# Patient Record
Sex: FEMALE | Race: BLACK OR AFRICAN AMERICAN
[De-identification: names, ages, dates, MRNs, and addresses within clinical notes are randomized per-mention and may not be internally consistent; named-entity substitution may affect disease eponyms.]

---

## 2017-01-28 ENCOUNTER — HOSPITAL ENCOUNTER (EMERGENCY)
Dept: HOSPITAL 74 - JER | Age: 27
Discharge: HOME | End: 2017-01-28
Payer: COMMERCIAL

## 2017-01-28 VITALS — SYSTOLIC BLOOD PRESSURE: 153 MMHG | HEART RATE: 106 BPM | TEMPERATURE: 98 F | DIASTOLIC BLOOD PRESSURE: 87 MMHG

## 2017-01-28 VITALS — BODY MASS INDEX: 32.1 KG/M2

## 2017-01-28 DIAGNOSIS — M21.612: Primary | ICD-10-CM

## 2017-01-28 DIAGNOSIS — F17.210: ICD-10-CM

## 2017-01-28 NOTE — PDOC
History of Present Illness





- General


Chief Complaint: Pain


Stated Complaint: LT LEG FEELS FUNNY


Time Seen by Provider: 01/28/17 16:52


History Source: Patient


Exam Limitations: No Limitations





- History of Present Illness


Initial Comments: 





01/28/17 17:21











Chief complaint: Strange sensation left foot at times








History of present illness: Patient is a 26-year-old female with no significant 

medical history here today complaining of a strange sensation in her left foot 

at times for the last few weeks that starts in her first met tarsal joint and 

at times spreads to her dorsal foot. Patient denies any swelling of her left 

calf or foot. Patient denies any numbness of her foot left. She denies any back 

pain. Patient reports that she does stand at times for long period of time. 

Patient reports wearing sneeze covers most of the time. Patient was reading on 

the Internet that she might have decreased circulation became scared and 

decided to come here. Patient reports that at time she sits with her leg bent 

and onto her left foot. She denies any injury to her foot.


Occurred: reports: other (intermittent for a few weeks left foot feels funny 

mostly at 1st mcp jt )


Severity: Yes: mild


Lower Extremity Pain Location: left: foot (first metatarsal  jt mostly )


Method of Injury: Yes: other (sit on left foot at times)


Modifying Factors: improves with: None





Lower Ext. Injury Location





- Specific Injury Location


Foot: left foot other (slight discomfort to left 1 st metatarsal  jt can 

radiate to dorsal foot )





Extremity Pain Location





- Extremity Pain Location


Extremity Pain Locations: left: foot (1 st metatarsal jt can radiate to dorsal 

foot)





Past History





- Past Medical History


Allergies/Adverse Reactions: 


 Allergies











Allergy/AdvReac Type Severity Reaction Status Date / Time


 


No Known Allergies Allergy   Verified 01/28/17 16:26











Home Medications: 


Ambulatory Orders





NK [No Known Home Medication]  11/21/15 








Asthma: No


Cancer: No


Cardiac Disorders: No


Diabetes: No


HTN: No


Seizures: No


Thyroid Disease: No





- Psycho/Social/Smoking Cessation Hx


Anxiety: No


Suicidal Ideation: No


Smoking History: Current every day smoker


Have you smoked in the past 12 months: Yes


Number of Cigarettes Smoked Daily: 2


Information on smoking cessation initiated: Yes


'Breaking Loose' booklet given: 01/28/17


Hx Alcohol Use: Yes (SOCIAL)


Drug/Substance Use Hx: No


Substance Use Type: None


Hx Substance Use Treatment: No





**Review of Systems





- Review of Systems


Able to Perform ROS?: Yes


Constitutional: No: Symptoms Reported


HEENTM: No: Symptoms Reported


Respiratory: No: Symptoms reported


Cardiac (ROS): No: Symptoms Reported


ABD/GI: No: Symptoms Reported


: No: Symptoms Reported


Musculoskeletal: Yes: Joint Pain (left foot starts at 1st metatarsal at time 

discomfort to dorsal foot)


Integumentary: No: Symptoms Reported


Neurological: No: Symptoms reported





*Physical Exam





- Vital Signs


 Last Vital Signs











Temp Pulse Resp BP Pulse Ox


 


 98.0 F   106 H  20   153/87   100 


 


 01/28/17 16:22  01/28/17 16:22  01/28/17 16:22  01/28/17 16:22  01/28/17 16:22














- Physical Exam


General Appearance: Yes: Appropriately Dressed


Respiratory/Chest: positive: Lungs Clear, Normal Breath Sounds.  negative: 

Chest Tender, Respiratory Distress


Cardiovascular: positive: Regular Rhythm, Regular Rate, S1, S2


Vascular Pulses: Doralis-Pedis (L): 4+


Musculoskeletal: positive: Normal Inspection.  negative: CVA Tenderness, CVA 

Tenderness (L), Decreased Range of Motion, Vertebral Tenderness


Extremity: positive: Normal Capillary Refill, Normal Inspection, Normal Range 

of Motion, Tender (left first metatarsal jt, bunion noted ), Other (no swelling 

of calf left, negative Perry left )


Integumentary: positive: Normal Color


Neurologic: positive: Alert, Normal Response (left foot ), Respond to painful 

stimul (left foot), Responsive.  negative: Numbness (left foot ), Sensory 

Deficit (left foot )


Deep Tendon Reflexes: Ankle (L): 4+





Medical Decision Making





- Medical Decision Making


01/28/17 17:21








01/28/17 17:23


 Patient is a 26-year-old female with no significant medical history here today 

complaining of a strange sensation in her left foot at times for the last few 

weeks that starts in her first met tarsal joint and at times spreads to her 

dorsal foot. Patient denies any swelling of her left calf or foot. Patient 

denies any numbness of her foot left. She denies any back pain. Patient reports 

that she does stand at times for long period of time. Patient reports wearing 

sneeze covers most of the time. Patient was reading on the Internet that she 

might have decreased circulation became scared and decided to come here. 

Patient reports that at time she sits with her leg bent and onto her left foot. 

She denies any injury to her foot.














Strange sensation left foot bunion noted left worse than right











Plan:





Follow-up with podiatry for further evaluation





*DC/Admit/Observation/Transfer


Diagnosis at time of Disposition: 


 Bunion of left foot





- Discharge Dispostion


Disposition: HOME


Condition at time of disposition: Stable





- Referrals


Referrals: 


Job Arizmendi MD [Primary Care Provider] - 


Lashaun Bueno MD [Staff Physician] - 





- Patient Instructions


Additional Instructions: 











follow Up with podiatrist this week for further evaluation








Return to emergency room if symptoms worsen any swelling of left foot or leg 











Avoid Sitting on your left foot

















She voiced understanding of discharge instructions and all questions were 

answered

## 2017-05-13 ENCOUNTER — HOSPITAL ENCOUNTER (EMERGENCY)
Dept: HOSPITAL 74 - JERFT | Age: 27
Discharge: HOME | End: 2017-05-13
Payer: COMMERCIAL

## 2017-05-13 VITALS — TEMPERATURE: 98.5 F | DIASTOLIC BLOOD PRESSURE: 59 MMHG | SYSTOLIC BLOOD PRESSURE: 105 MMHG | HEART RATE: 92 BPM

## 2017-05-13 VITALS — BODY MASS INDEX: 35.1 KG/M2

## 2017-05-13 DIAGNOSIS — F17.210: ICD-10-CM

## 2017-05-13 DIAGNOSIS — M21.612: Primary | ICD-10-CM

## 2017-05-13 PROCEDURE — 3E0233Z INTRODUCTION OF ANTI-INFLAMMATORY INTO MUSCLE, PERCUTANEOUS APPROACH: ICD-10-PCS

## 2017-05-13 NOTE — PDOC
History of Present Illness





- General


Chief Complaint: Pain


Stated Complaint: FOOT PAIN


Time Seen by Provider: 05/13/17 10:15


History Source: Patient


Exam Limitations: No Limitations





- History of Present Illness


Initial Comments: 





05/13/17 10:32


C/o pain to left great toe / foot since yesterday/ Knows has Bunion with 

appointment to Podiatry the end of the month. 


Occurred: reports: just prior to arrival, yesterday


Severity: reports: mild, moderate


Pain Location: reports: lower extremity (left foot- )


Modifying Factors: improves with: None





Past History





- Travel


Traveled outside of the country in the last 30 days: No


Close contact w/someone who was outside of country & ill: No





- Past Medical History


Allergies/Adverse Reactions: 


 Allergies











Allergy/AdvReac Type Severity Reaction Status Date / Time


 


No Known Allergies Allergy   Verified 05/13/17 09:58











Home Medications: 


Ambulatory Orders





Naproxen [Naprosyn -] 500 mg PO BID #20 tablet 05/13/17 








Asthma: No


Cancer: No


Cardiac Disorders: No


Diabetes: No


HTN: No


Seizures: No


Thyroid Disease: No


Other medical history: denies.





- Psycho/Social/Smoking Cessation Hx


Anxiety: No


Suicidal Ideation: No


Smoking History: Current every day smoker


Have you smoked in the past 12 months: Yes


Number of Cigarettes Smoked Daily: 2


Information on smoking cessation initiated: No


'Breaking Loose' booklet given: 01/28/17


Hx Alcohol Use: Yes (occassionally.)


Drug/Substance Use Hx: No


Substance Use Type: Alcohol


Hx Substance Use Treatment: No





**Review of Systems





- Review of Systems


Able to Perform ROS?: Yes


Is the patient limited English proficient: Yes


Constitutional: Yes: See HPI.  No: Symptoms Reported, Chills, Fever


HEENTM: No: Symptoms Reported


Respiratory: No: Symptoms reported


Musculoskeletal: Yes: Symptoms Reported, Joint Pain, Joint Swelling


Integumentary: Yes: Symptoms Reported, See HPI


All Other Systems: Reviewed and Negative





*Physical Exam





- Vital Signs


 Last Vital Signs











Temp Pulse Resp BP Pulse Ox


 


 98.5 F   92 H  18   105/59   98 


 


 05/13/17 09:58  05/13/17 09:58  05/13/17 09:58  05/13/17 09:58  05/13/17 09:58














- Physical Exam


General Appearance: Yes: Nourished, Appropriately Dressed, Mild Distress


HEENT: positive: CISCO, Normal ENT Inspection, Normal Voice, TMs Normal, Pharynx 

Normal


Neck: positive: Supple.  negative: Tender


Respiratory/Chest: positive: Lungs Clear


Gastrointestinal/Abdominal: positive: Normal Bowel Sounds, Tender, Flat, Soft


Extremity: positive: Normal Capillary Refill.  negative: Normal Inspection (

mild valgus deformity to left great toe. pain at MCP 1st toe, )


Neurologic: positive: CNs II-XII NML intact, Fully Oriented, Alert, Normal Mood/

Affect, Normal Response, Motor Strength 5/5





Progress Note





- Progress Note


Progress Note: 


Bunion pain / will treat with NSAIDs 





*DC/Admit/Observation/Transfer


Diagnosis at time of Disposition: 


 Bunion of left foot





- Discharge Dispostion


Disposition: HOME


Condition at time of disposition: Stable


Admit: No





- Prescriptions


Prescriptions: 


Naproxen [Naprosyn -] 500 mg PO BID #20 tablet





- Referrals


Referrals: 


Job Arizmendi MD [Primary Care Provider] - 





- Patient Instructions


Printed Discharge Instructions:  DI for Bunion


Additional Instructions: 


Rest, ice to area on and off for 15 minutes 4-6 times a day


Avoid heavy lifting or exercise until pain and swelling is resolved or until 

further directed


Keep area highly elevated to reduce swelling


Use splints/Ace wrap as directed


Followup with orthopedist in one to 2 days if not improving, 


    if significantly improved may wait one week for followup with orthopedist





May use Naprosyn 1-500 milligrams tablet 8 hours as needed for pain





- Post Discharge Activity


Work/School Note:  Back to Work

## 2017-09-08 ENCOUNTER — HOSPITAL ENCOUNTER (EMERGENCY)
Dept: HOSPITAL 74 - JERFT | Age: 27
Discharge: HOME | End: 2017-09-08
Payer: COMMERCIAL

## 2017-09-08 VITALS — TEMPERATURE: 98.2 F | DIASTOLIC BLOOD PRESSURE: 92 MMHG | SYSTOLIC BLOOD PRESSURE: 128 MMHG | HEART RATE: 97 BPM

## 2017-09-08 VITALS — BODY MASS INDEX: 34.1 KG/M2

## 2017-09-08 DIAGNOSIS — M21.612: Primary | ICD-10-CM

## 2017-09-08 DIAGNOSIS — M20.12: ICD-10-CM

## 2017-09-08 NOTE — PDOC
History of Present Illness





- General


Chief Complaint: Pain


Stated Complaint: LT FOOT PAIN


History Source: Patient


Exam Limitations: No Limitations





- History of Present Illness


Initial Comments: 





09/08/17 16:18











CHIEF COMPLAINT: LEFT FOOT PAIN CHRONIC WORSE RECENTLY





HISTORY OF PRESENT ILLNESS:Patient is a 27-year-old female with no significant 

medical history except for a bunion on her left foot here today complaining of 

worsening pain to the bunion area over the last few weeks. Patient had seen 

podiatrist and podiatrist had wanted an x-ray of the area Dr. Lashaun Nobles. 

Patient denies doing any strenuous activities or any injury to her left foot. 

Patient denies any chance of pregnancy is on birth control.








Occurred: reports: other (MONTHS  WORSE RECENTLY )


Lower Extremity Pain Location: left: 1st toe (proximal aspect at metatarsal jt)


Method of Injury: Yes: other (no injury )


Modifying Factors: improves with: None





Lower Ext. Injury Location





- Specific Injury Location


Foot: left foot normal range of motion, left foot bone tenderness (1st 

metatarsal ), left foot pain (1st metatarsal ), left foot swelling (deformity 

noted at 1st metatarsal jt)





Extremity Pain Location





- Extremity Pain Location


Extremity Pain Locations: left: 1st toe (metatarsal )





Past History





- Past Medical History


Allergies/Adverse Reactions: 


 Allergies











Allergy/AdvReac Type Severity Reaction Status Date / Time


 


No Known Allergies Allergy   Verified 09/08/17 13:28











Home Medications: 


Ambulatory Orders





NK [No Known Home Medication]  09/08/17 








Asthma: No


Cancer: No


Cardiac Disorders: No


Diabetes: No


HTN: No


Seizures: No


Thyroid Disease: No


Other medical history: denies





- Psycho/Social/Smoking Cessation Hx


Anxiety: No


Suicidal Ideation: No


Smoking History: Current every day smoker


Have you smoked in the past 12 months: Yes


Number of Cigarettes Smoked Daily: 5


Information on smoking cessation initiated: Yes


'Breaking Loose' booklet given: 09/08/17


Hx Alcohol Use: No


Drug/Substance Use Hx: No


Substance Use Type: None


Hx Substance Use Treatment: No





**Review of Systems





- Review of Systems


Able to Perform ROS?: Yes


Constitutional: No: Symptoms Reported


HEENTM: No: Symptoms Reported


Respiratory: No: Symptoms reported


Cardiac (ROS): No: Symptoms Reported


ABD/GI: No: Symptoms Reported


: No: Symptoms Reported


Musculoskeletal: Yes: Joint Pain (left proximal jt deformity noted ), Joint 

Swelling (left metatarsal jt deformity noted )


Integumentary: No: Symptoms Reported


Neurological: No: Symptoms reported





*Physical Exam





- Vital Signs


 Last Vital Signs











Temp Pulse Resp BP Pulse Ox


 


 98.2 F   97 H  18   128/92   100 


 


 09/08/17 13:25  09/08/17 13:25  09/08/17 13:25  09/08/17 13:25  09/08/17 13:25














- Physical Exam


General Appearance: Yes: Appropriately Dressed


Vascular Pulses: Doralis-Pedis (L): 4+


Extremity: positive: Normal Capillary Refill, Normal Range of Motion (left foot 

toes, left metatarsal jt), Tender (left 1st metatarsal jt), Swelling (deformity 

of 1 st left metatarsal jt).  negative: Normal Inspection


Integumentary: positive: Normal Color


Neurologic: positive: Alert, Normal Response, Respond to painful stimul (left 

foot ), Responsive.  negative: Numbness, Sensory Deficit (left foot )





ED Treatment Course





- RADIOLOGY


Radiology Studies Ordered: 














 Category Date Time Status


 


 FOOT-LEFT [RAD] Stat Radiology  09/08/17 15:26 Completed














Medical Decision Making





- Medical Decision Making


09/08/17 16:20


Patient is a 27-year-old female with no significant medical history except for 

a bunion on her left foot here today complaining of worsening pain to the 

bunion area over the last few weeks. Patient had seen podiatrist and podiatrist 

had wanted an x-ray of the area Dr. Lashaun Nobles. Patient denies doing any 

strenuous activities or any injury to her left foot. Patient denies any chance 

of pregnancy is on birth control.








LEFT FOOT BUNION 











PLAN: 





XRAY LEFT FOOT LEFT HALLUX VALGUS 


FOLLOW UP WITH DR. LASHAUN SHETH

















09/08/17 16:22








09/08/17 20:20








*DC/Admit/Observation/Transfer


Diagnosis at time of Disposition: 


 Bunion of great toe





- Discharge Dispostion


Disposition: HOME


Condition at time of disposition: Stable





- Referrals


Referrals: 


Job Arizmendi MD [Primary Care Provider] - 





- Patient Instructions


Additional Instructions: 


FOLLOW UP WITH PODIATRIST AS SOON AS POSSIBLE WITH CD OF XRAY 











TAKE IBUPROFEN, ALEVE AND ADVIL 








AVOID WEARING SHOES WITH HEELS 














PATIENT VOICED UNDERSTANDING OF DISCHARGE INSTRUCTIONS AND ALL QUESTIONS WERE 

ANSWERED 





- Post Discharge Activity


Work/School Note:  Back to Work

## 2018-05-04 ENCOUNTER — HOSPITAL ENCOUNTER (EMERGENCY)
Dept: HOSPITAL 74 - JERFT | Age: 28
Discharge: HOME | End: 2018-05-04
Payer: COMMERCIAL

## 2018-05-04 VITALS — DIASTOLIC BLOOD PRESSURE: 73 MMHG | TEMPERATURE: 98.3 F | SYSTOLIC BLOOD PRESSURE: 121 MMHG | HEART RATE: 98 BPM

## 2018-05-04 VITALS — BODY MASS INDEX: 31.4 KG/M2
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
## 2018-05-04 DIAGNOSIS — F17.210: ICD-10-CM
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
## 2018-05-04 DIAGNOSIS — M79.662: Primary | ICD-10-CM
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
## 2019-04-17 ENCOUNTER — HOSPITAL ENCOUNTER (EMERGENCY)
Dept: HOSPITAL 74 - JERFT | Age: 29
Discharge: HOME | End: 2019-04-17
Payer: SELF-PAY

## 2019-04-17 VITALS — DIASTOLIC BLOOD PRESSURE: 75 MMHG | SYSTOLIC BLOOD PRESSURE: 126 MMHG | HEART RATE: 91 BPM | TEMPERATURE: 98.3 F

## 2019-04-17 VITALS — BODY MASS INDEX: 28.3 KG/M2

## 2019-04-17 DIAGNOSIS — J06.9: Primary | ICD-10-CM

## 2019-04-17 NOTE — PDOC
History of Present Illness





- General


Chief Complaint: Cold Symptoms


Stated Complaint: COLD SYMPTOMS


Time Seen by Provider: 04/17/19 13:35


History Source: Patient


Exam Limitations: No Limitations





Past History





- Travel


Traveled outside of the country in the last 30 days: No


Close contact w/someone who was outside of country & ill: No





- Past Medical History


Allergies/Adverse Reactions: 


 Allergies











Allergy/AdvReac Type Severity Reaction Status Date / Time


 


No Known Allergies Allergy   Verified 04/17/19 13:32











Home Medications: 


Ambulatory Orders





Fluticasone Prop 0.05% Nasal [Flonase -] 1 - 2 spray NS DAILY #1 spray.pump 04/ 17/19 


Pseudoephedrine HCl 30 mg PO Q8H #15 tablet 04/17/19 








Asthma: No


Cancer: No


Cardiac Disorders: No


COPD: No


Diabetes: No


HTN: No


Seizures: No


Thyroid Disease: No





- Immunization History


Immunization Up to Date: Yes





- Suicide/Smoking/Psychosocial Hx


Smoking History: Never smoked


Have you smoked in the past 12 months: No


Number of Cigarettes Smoked Daily: 5


Information on smoking cessation initiated: No


'Breaking Loose' booklet given: 09/08/17


Hx Alcohol Use: No


Drug/Substance Use Hx: No


Substance Use Type: None


Hx Substance Use Treatment: No





**Review of Systems





- Review of Systems


Able to Perform ROS?: Yes


Comments:: 





04/17/19 13:51


CONSTITUTIONAL: 


Absent: fever, chills, diaphoresis, generalized weakness, malaise, loss of 

appetite


HEENT: 


Present: rhinorrhea, throat pain, nasal congestion Absent: throat swelling, 

difficulty swallowing, mouth swelling, ear pain, eye pain, visual Changes


CARDIOVASCULAR: 


Absent: chest pain, loss of consciousness, palpitations, irregular heart rate, 

peripheral edema


RESPIRATORY: 


Present: cough Absent: cough, shortness of breath, dyspnea with exertion, 

orthopnea, wheezing, stridor, hemoptysis


GASTROINTESTINAL:


Absent: abdominal pain, abdominal distension, nausea, vomiting, diarrhea, 

constipation, melena, hematochezia


GENITOURINARY: 


Absent: dysuria, frequency, urgency, hesitancy, hematuria, flank pain, genital 

pain


MUSCULOSKELETAL: 


Absent: myalgia, arthralgia, joint swelling


SKIN: 


Absent: rash, itching, pallor


HEMATOLOGIC/IMMUNOLOGIC: 


Absent: easy bleeding, easy bruising, lymphadenopathy, frequent infections


ENDOCRINE:


Absent: unexplained weight gain, unexplained weight loss, heat intolerance, 

cold intolerance


NEUROLOGIC: 


Absent: headache, focal weakness or paresthesias, dizziness, unsteady gait, 

seizure, mental status changes, bladder or bowel incontinence


PSYCHIATRIC: 


Absent: anxiety, depression, suicidal or homicidal ideation, hallucinations.





Is the patient limited English proficient: No





*Physical Exam





- Vital Signs


 Last Vital Signs











Temp Pulse Resp BP Pulse Ox


 


 98.3 F   91 H  18   126/75   99 


 


 04/17/19 13:29  04/17/19 13:29  04/17/19 13:29  04/17/19 13:29  04/17/19 13:29














- Physical Exam


Comments: 





04/17/19 13:55


GENERAL:


Well developed, well nourished. Awake and alert. No acute distress.


HEENT:


Normocephalic, atraumatic. PERRLA, EOMI. No conjunctival pallor. Sclera are non-

icteric. Moist mucous membranes. Oropharynx is clear. Injected nasal turbinates


NECK: 


Supple. Full ROM. No JVD. Carotid pulses 2+ and symmetric, without bruits. No 

thyromegaly. No lymphadenopathy.


CARDIOVASCULAR:


Regular rate and rhythm. No murmurs, rubs, or gallops. Distal pulses are 2+ and 

symmetric. 


PULMONARY: 


No evidence of respiratory distress. Lungs clear to auscultation bilaterally. 

No wheezing, rales or rhonchi.


SKIN: 


Warm and dry. Normal capillary refill. No rashes. No jaundice. 


NEUROLOGICAL: 


Alert, awake, appropriate. Cranial nerves 2-12 intact. No deficits to light 

touch and temperature in face, upper extremities and lower extremities. No 

motor deficits in the in face, upper extremities and lower extremities. 

Normoreflexic in the upper and lower extremities. Normal speech. Toes are down-

going bilaterally. Gait is normal without ataxia.








Medical Decision Making





- Medical Decision Making





04/17/19 13:56


The patient is a 28-year-old femaleno past medical history presents to the ER 

today for 3 days of body aches, chills, cough, sore throat.  She states she has 

been taking Advil, NyQuil for her symptoms with relief.  She states that now 

she is mostly congested and has trouble breathing through her right nostril.  

Denies fevers, chills, difficulty breathing, shortness of breath, earache, 

chest pain, nausea, vomiting and diarrhea.





A/P: Upper respiratory infection.


Centor criteria is a 0 at this time.  Will defer strep testing.


We will add Sudafed and Flonase for symptomatic treatment of congestion.


Patient to follow-up with her primary care doctor.


Discharge home


I discussed the physical exam findings, ancillary test results and final 

diagnoses with the patient. I answered all of the patient's questions. The 

patient was satisfied with the care received and felt comfortable with the 

discharge plan and treatment plan.  The Patient agrees to follow up with the 

primary care physician/specialist within 24-72 hours. Return precautions were 

given.





*DC/Admit/Observation/Transfer


Diagnosis at time of Disposition: 


URI (upper respiratory infection)


Qualifiers:


 URI type: unspecified viral URI Qualified Code(s): J06.9 - Acute upper 

respiratory infection, unspecified








- Discharge Dispostion


Disposition: HOME


Condition at time of disposition: Stable


Decision to Admit order: No





- Referrals


Referrals: 


Job Arizmendi MD [Primary Care Provider] - 





- Patient Instructions


Printed Discharge Instructions:  DI for Viral Upper Respiratory Infection -- 

Adult


Additional Instructions: 


You have an upper respiratory infection, or the common cold.


Take the pseudofed and flonase as directed


Please take Motrin 800 mg every 8 hours as needed for pain not to exceed 3000 

mg a day.


Drink plenty of fluids.


Cough drops and warm tea may help your symptoms as well.


Please follow up with her primary care doctor this week.





Return to the emergency department if you have difficulty breathing, shortness 

of breath, worsening pain, nausea, vomiting or if you have any changes in your 

symptoms





- Post Discharge Activity


Forms/Work/School Notes:  Back to Work